# Patient Record
Sex: MALE | Race: WHITE | NOT HISPANIC OR LATINO | Employment: FULL TIME | URBAN - METROPOLITAN AREA
[De-identification: names, ages, dates, MRNs, and addresses within clinical notes are randomized per-mention and may not be internally consistent; named-entity substitution may affect disease eponyms.]

---

## 2024-03-03 ENCOUNTER — HOSPITAL ENCOUNTER (EMERGENCY)
Facility: HOSPITAL | Age: 29
Discharge: HOME | End: 2024-03-03
Attending: EMERGENCY MEDICINE | Admitting: EMERGENCY MEDICINE
Payer: COMMERCIAL

## 2024-03-03 VITALS
RESPIRATION RATE: 18 BRPM | HEART RATE: 62 BPM | BODY MASS INDEX: 27.4 KG/M2 | DIASTOLIC BLOOD PRESSURE: 93 MMHG | TEMPERATURE: 97.7 F | HEIGHT: 76 IN | WEIGHT: 225 LBS | OXYGEN SATURATION: 98 % | SYSTOLIC BLOOD PRESSURE: 144 MMHG

## 2024-03-03 DIAGNOSIS — L51.9 ERYTHEMA MULTIFORME: ICD-10-CM

## 2024-03-03 DIAGNOSIS — K13.70 MOUTH LESION: Primary | ICD-10-CM

## 2024-03-03 PROCEDURE — 99282 EMERGENCY DEPT VISIT SF MDM: CPT

## 2024-03-03 RX ORDER — PREDNISONE 20 MG/1
TABLET ORAL
Qty: 8 TABLET | Refills: 0 | Status: SHIPPED | OUTPATIENT
Start: 2024-03-03 | End: 2024-03-13

## 2024-03-03 NOTE — ED PROVIDER NOTES
Subjective   History of Present Illness  This is a pleasant 28-year-old male accompanied by a friend.  He is a  at [employer].  He is followed by [facility] and also sees Dr. Roberto Mcbride for Crohn's disease maintained on a biologic, a Humira equivalent, that he has not had in 2 weeks.  Otherwise he is healthy and active.    He comes emergency room today with painful lesions in his mouth for 2 days.  Initially he thought he burned his mouth eating pizza on Friday but then got more lesions in his mouth.  Interestingly he had this happen to him of June 2023 with a breakout on his skin as well.  He was traveling in [place] at the time for work and was eventually seen in the emergency department there and actually ended up being diagnosed with erythema multiforme.  He was treated with a combination of oral steroids and Magic mouthwash and improved.  He has not had recurrence since but he said this is very similar to his previous outbreak though he has not had any skin lesions with this outbreak.  He has no history of oral herpes and actually it sounds like he was swabbed and treated for initially oral herpes with antiviral in June that was ineffective.  Does not sound like he ever had a biopsy of the lesions in his mouth or on his arm.    Currently he has no fevers or chills and no difficulty swallowing.  Bowel movements and urine of been his baseline and his Crohn's has been well-controlled.  No history of diabetes.  He has had no recent illness.  No f runny nose sore throat or cough.    He drinks alcohol occasionally.  He does dip a nicotine-containing packet but does not have any tobacco and he uses it on his lower lip.  He does smoke occasional marijuana.        All other systems are reviewed and are negative except as noted above.        Review of Systems    No past medical history on file.    Allergies   Allergen Reactions    Azathioprine Other (See Comments)      MEDICATION GAVE PT PANCREATITIS       No past surgical history on file.    No family history on file.    Social History     Socioeconomic History    Marital status: Single           Objective   Physical Exam  Vitals and nursing note reviewed.   Constitutional:       Comments: Pleasant 28-year-old alert and oriented [employer] 15.  No distress.  Speaks normally.   HENT:      Head: Normocephalic and atraumatic.      Right Ear: External ear normal.      Left Ear: External ear normal.      Nose: Nose normal.      Mouth/Throat:      Comments: His external lips are unremarkable.  When I everted his upper lip on the left upper portion up into his gum he has a nonspecific very minimal swelling does not look like aphthous stomatitis and patient says it looks to him as his previous lesions were erythema multiforme.  Lower lip is unremarkable.  His tongue is unremarkable his teeth are in good repair.  His uvula is midline and soft palate hard palate without lesions.  Eyes:      Extraocular Movements: Extraocular movements intact.      Conjunctiva/sclera: Conjunctivae normal.      Pupils: Pupils are equal, round, and reactive to light.   Neck:      Vascular: No carotid bruit.   Cardiovascular:      Pulses: Normal pulses.   Pulmonary:      Effort: Pulmonary effort is normal.   Musculoskeletal:      Cervical back: Normal range of motion and neck supple. No rigidity or tenderness.      Comments: Upper extremities with good pulses no edema or synovitis.  He has some healed scars especially in his left upper extremity that do look like old erythema multiforme.  But nothing active.  Lower extremities are unremarkable with good pulses no edema synovitis rash venous cords or rash.   Lymphadenopathy:      Cervical: No cervical adenopathy.   Skin:     General: Skin is warm and dry.      Capillary Refill: Capillary refill takes less than 2 seconds.   Neurological:      Comments: Face symmetric, voice strong, tongue midline.  Vision, hearing,  "and speech preserved.  No focal weakness.         Procedures           ED Course                                 No results found for this or any previous visit (from the past 24 hour(s)).  Note: In addition to lab results from this visit, the labs listed above may include labs taken at another facility or during a different encounter within the last 24 hours. Please correlate lab times with ED admission and discharge times for further clarification of the services performed during this visit.    No orders to display     Vitals:    03/03/24 0759   BP: 144/93   BP Location: Left arm   Patient Position: Sitting   Pulse: 62   Resp: 18   Temp: 97.7 °F (36.5 °C)   TempSrc: Oral   SpO2: 98%   Weight: 102 kg (225 lb)   Height: 193 cm (76\")     Medications - No data to display  ECG/EMG Results (last 24 hours)       ** No results found for the last 24 hours. 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  Certainly is what it feels like to him.  He did respond well to oral steroids and Magic mouthwash previously and so we will go ahead and do that he believes he is on 20 mg a day for 5 days and 10 mg a day for 5 days and so we will go ahead and prescribe that.    I have asked him to follow-up with his PCP as if he has persistent issues he may need to see a oral pathologist at .  May even need biopsy if there is some question as to the diagnosis if he does not respond to therapy.    Return to the emergency department if worse in any way.    All are agreeable with the plan    Problems Addressed:  Erythema multiforme: complicated acute illness or injury with systemic symptoms  Mouth lesion: complicated acute illness or injury    Risk  Prescription drug management.        Final diagnoses:   Mouth lesion   Erythema multiforme       ED Disposition  ED Disposition       ED " Disposition   Discharge    Condition   Stable    Comment   --               Roberto Mcbride MD  [address]  [facility] [address]  [place] [id number] [phone]          Franc Mcdermott MD  [address]  [facility] [address]  [place] [id number] [phone]    In 3 days           Medication List        New Prescriptions      Magic Mouthwash Oral Suspension (diphenhydrAMINE HCl - aluminum & magnesium hydroxide-simethicone - lidocaine - nystatin)  Swish and spit 10 mL Every 4 (Four) Hours As Needed for Stomatitis.     predniSONE 20 MG tablet  Commonly known as: DELTASONE  Take 1 tablet by mouth Daily for 5 days, THEN 0.5 tablets Daily for 5 days.  Start taking on: March 3, 2024               Where to Get Your Medications        These medications were sent to [facility]/pharmacy #[id number] - [place], [place] - [address]  - [phone]  - [phone] FX  [address] [facility] [address]      Hours: 24-hours Phone: [phone]   predniSONE 20 MG tablet       You can get these medications from any pharmacy    Bring a paper prescription for each of these medications  Magic Mouthwash Oral Suspension (diphenhydrAMINE HCl - aluminum & magnesium hydroxide-simethicone - lidocaine - nystatin)            Vincent Stevens MD  03/03/24 9131

## 2024-03-03 NOTE — DISCHARGE INSTRUCTIONS
As we discussed if you have persistent issues likely will need to see your primary care and perhaps be referred to oral pathology at  for consideration of biopsy.